# Patient Record
Sex: MALE | Race: WHITE | Employment: FULL TIME | URBAN - METROPOLITAN AREA
[De-identification: names, ages, dates, MRNs, and addresses within clinical notes are randomized per-mention and may not be internally consistent; named-entity substitution may affect disease eponyms.]

---

## 2017-02-17 ENCOUNTER — OFFICE VISIT (OUTPATIENT)
Dept: NEUROLOGY | Age: 32
End: 2017-02-17

## 2017-02-17 VITALS
HEART RATE: 78 BPM | HEIGHT: 68 IN | DIASTOLIC BLOOD PRESSURE: 80 MMHG | SYSTOLIC BLOOD PRESSURE: 140 MMHG | OXYGEN SATURATION: 98 % | WEIGHT: 244 LBS | BODY MASS INDEX: 36.98 KG/M2

## 2017-02-17 DIAGNOSIS — R55 SYNCOPE, UNSPECIFIED SYNCOPE TYPE: Primary | ICD-10-CM

## 2017-02-17 NOTE — MR AVS SNAPSHOT
Visit Information Date & Time Provider Department Dept. Phone Encounter #  
 2/17/2017  9:00 AM Torri Dao MD Neurology Clinic at [facility] [phone] [id number] Your Appointments 3/1/2017  9:45 AM  
New Patient with Bernardino Goldberg MD  
[facility]) Appt Note: np syonpee, ref by dr Sarah Ybarra, nicko [facility] 2-16-17  
 [id number] [facility]  
[phone] [address] [facility] Upcoming Health Maintenance Date Due DTaP/Tdap/Td series (1 - Tdap) 3/23/2006 INFLUENZA AGE 9 TO ADULT 8/1/2016 Allergies as of 2/17/2017  Never Reviewed Not on File Current Immunizations  Never Reviewed No immunizations on file. Not reviewed this visit You Were Diagnosed With   
  
 Codes Comments Syncope, unspecified syncope type    -  Primary ICD-10-CM: R55 
ICD-9-CM: 780. 2 Vitals BP Pulse Height(growth percentile) Weight(growth percentile) SpO2 BMI  
 140/80 78 5' 8\" (1.727 m) 244 lb (110.7 kg) 98% 37.1 kg/m2 Smoking Status Never Smoker Vitals History BMI and BSA Data Body Mass Index Body Surface Area  
 37.1 kg/m 2 2.3 m 2 Your Updated Medication List  
  
Notice  As of 2/17/2017  9:42 AM  
 You have not been prescribed any medications. To-Do List   
 02/18/2017 Imaging:  DUPLEX CAROTID BILATERAL AMB NEURO   
  
 02/18/2017 Neurology:  EEG   
  
 02/18/2017 Imaging:  MRI BRAIN W WO CONT Patient Instructions PRESCRIPTION REFILL POLICY [facility] Statement to Patients April 1, 2014 In an effort to ensure the large volume of patient prescription refills is processed in the most efficient and expeditious manner, we are asking our patients to assist us by calling your Pharmacy for all prescription refills, this will include also your  Mail Order Pharmacy. The pharmacy will contact our office electronically to continue the refill process. Please do not wait until the last minute to call your pharmacy. We need at least 48 hours (2days) to fill prescriptions. We also encourage you to call your pharmacy before going to  your prescription to make sure it is ready. With regard to controlled substance prescription refill requests (narcotic refills) that need to be picked up at our office, we ask your cooperation by providing us with at least 72 hours (3days) notice that you will need a refill. We will not refill narcotic prescription refill requests after 4:00pm on any weekday, Monday through Thursday, or after 2:00pm on Fridays, or on the weekends. We encourage everyone to explore another way of getting your prescription refill request processed using [employer], our patient web portal through our electronic medical record system. Cl[employer]rix is an efficient and effective way to communicate your medication request directly to the office and  downloadable as an sofia on your smart phone . [employer] also features a review functionality that allows you to view your medication list as well as leave messages for your physician. Are you ready to get connected? If so please review the attatched instructions or speak to any of our staff to get you set up right away! Thank you so much for your cooperation. Should you have any questions please contact our Practice Administrator. The Physicians and Staff,  [facility] Patient Instructions/Plans: 
 
 
Tips for [address] [place] [address] Drive Here are a few things you can do to help someone who is having a seizure of any kind: 
When encountering someone having a seizure many people feel ill-equipped in handling this circumstance. They forget that they already have within their possession one essential tool- common sense.  The following tips below are simple, common-sense steps to take when responding to a person having a seizure. First-aid isnt complicated, but it involves a sequence of actions and considerations which are beyond the scope of epilepsy. com. If you havent taken a course in first-aid and CPR (cardiopulmonary resuscitation) you can check within your community for classes being offered. Many seizure types-such as generalized absence seizures or complex partial seizures, which involve relatively brief episodes of unresponsiveness- dont require any specific first-aid measures. 1. Stay calm 2. Prevent injury During the seizure, you can exercise your common sense by insuring there is nothing within reach that could harm the person if she struck it. 3. Pay attention to the length of the seizure 4. Make the person as comfortable as possible 5. Keep onlookers away 6. Do not hold the person down If the person having a seizure thrashes around there is no need for you to restrain them. Remember to consider your safety as well 7. Do not put anything in the person's mouth Contrary to popular belief, a person having a seizure is incapable of swallowing their tongue so you can breathe easy in the knowledge that you do not have to stick your fingers into the mouth of someone in this condition. 8. Do not give the person water, pills, or food until fully alert 9. If the seizure continues for longer than five minutes, call 911 
10. Be sensitive and supportive, and ask others to do the same After the seizure, the person should be placed on her left side. Keep in mind there is a small risk of post-seizure vomiting, before the person is fully alert. Therefore, the persons head should be turned so that any vomit will drain out of the mouth without being inhaled. Stay with the person until she recovers (5 to 20 minutes). Introducing [facility]!    
 Olimpia Yanes introduces [employer] patient portal. Now you can access parts of your medical record, email your doctor's office, and request medication refills online. 1. In your internet browser, go to [email or web address] 2. Click on the First Time User? Click Here link in the Sign In box. You will see the New Member Sign Up page. 3. Enter your MegaHoot Access Code exactly as it appears below. You will not need to use this code after youve completed the sign-up process. If you do not sign up before the expiration date, you must request a new code. · MegaHoot Access Code: [email or web address] Expires: 5/18/2017  8:20 AM 
 
4. Enter the last four digits of your Social Security Number (xxxx) and Date of Birth (mm/dd/yyyy) as indicated and click Submit. You will be taken to the next sign-up page. 5. Create a MegaHoot ID. This will be your MegaHoot login ID and cannot be changed, so think of one that is secure and easy to remember. 6. Create a MegaHoot password. You can change your password at any time. 7. Enter your Password Reset Question and Answer. This can be used at a later time if you forget your password. 8. Enter your e-mail address. You will receive e-mail notification when new information is available in [address] 9. Click Sign Up. You can now view and download portions of your medical record. 10. Click the Download Summary menu link to download a portable copy of your medical information. If you have questions, please visit the Frequently Asked Questions section of the MegaH[employer] website. Remember, MegaHoot is NOT to be used for urgent needs. For medical emergencies, dial 911. Now available from your iPhone and Android! Please provide this summary of care documentation to your next provider. Your primary care clinician is listed as Tanner Gray. If you have any questions after today's visit, please call [phone].
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
## 2017-02-17 NOTE — PATIENT INSTRUCTIONS
[facility]   Statement to Patients  April 1, 2014      In an effort to ensure the large volume of patient prescription refills is processed in the most efficient and expeditious manner, we are asking our patients to assist us by calling your Pharmacy for all prescription refills, this will include also your  Mail Order Pharmacy. The pharmacy will contact our office electronically to continue the refill process. Please do not wait until the last minute to call your pharmacy. We need at least 48 hours (2days) to fill prescriptions. We also encourage you to call your pharmacy before going to  your prescription to make sure it is ready. With regard to controlled substance prescription refill requests (narcotic refills) that need to be picked up at our office, we ask your cooperation by providing us with at least 72 hours (3days) notice that you will need a refill. We will not refill narcotic prescription refill requests after 4:00pm on any weekday, Monday through Thursday, or after 2:00pm on Fridays, or on the weekends. We encourage everyone to explore another way of getting your prescription refill request processed using [employer], our patient web portal through our electronic medical record system. [employer] is an efficient and effective way to communicate your medication request directly to the office and  downloadable as an sofia on your smart phone . Recommend[employer] also features a review functionality that allows you to view your medication list as well as leave messages for your physician. Are you ready to get connected? If so please review the attatched instructions or speak to any of our staff to get you set up right away! Thank you so much for your cooperation. Should you have any questions please contact our Practice Administrator.     The Physicians and Staff,  [facility]     Patient Instructions/Plans:      Tips for [facility] FIRST AID    Here are a few things you can do to help someone who is having a seizure of any kind:  When encountering someone having a seizure many people feel ill-equipped in handling this circumstance. They forget that they already have within their possession one essential tool- common sense. The following tips below are simple, common-sense steps to take when responding to a person having a seizure. First-aid isnt complicated, but it involves a sequence of actions and considerations which are beyond the scope of epilepsy. com. If you havent taken a course in first-aid and CPR (cardiopulmonary resuscitation) you can check within your community for classes being offered. Many seizure types-such as generalized absence seizures or complex partial seizures, which involve relatively brief episodes of unresponsiveness- dont require any specific first-aid measures. 1. Stay calm  2. Prevent injury  During the seizure, you can exercise your common sense by insuring there is nothing within reach that could harm the person if she struck it. 3. Pay attention to the length of the seizure  4. Make the person as comfortable as possible  5. Keep onlookers away  6. Do not hold the person down  If the person having a seizure thrashes around there is no need for you to restrain them. Remember to consider your safety as well  7. Do not put anything in the person's mouth  Contrary to popular belief, a person having a seizure is incapable of swallowing their tongue so you can breathe easy in the knowledge that you do not have to stick your fingers into the mouth of someone in this condition. 8. Do not give the person water, pills, or food until fully alert  9. If the seizure continues for longer than five minutes, call 911  10. Be sensitive and supportive, and ask others to do the same  After the seizure, the person should be placed on her left side.  Keep in mind there is a small risk of post-seizure vomiting, before the person is fully alert. Therefore, the persons head should be turned so that any vomit will drain out of the mouth without being inhaled. Stay with the person until she recovers (5 to 20 minutes).
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
## 2017-02-23 ENCOUNTER — OFFICE VISIT (OUTPATIENT)
Dept: NEUROLOGY | Age: 32
End: 2017-02-23

## 2017-02-23 DIAGNOSIS — I65.23 STENOSIS OF BOTH INTERNAL CAROTID ARTERIES: ICD-10-CM

## 2017-02-23 DIAGNOSIS — R55 SYNCOPE, UNSPECIFIED SYNCOPE TYPE: Primary | ICD-10-CM

## 2017-02-23 NOTE — PROCEDURES
This study consisted of pulsed wave Doppler examination, Color-flow imaging, and Duplex imaging of both the right and left carotid systems, and both vertebral arteries.        Imaging of both right and left carotid systems showed no mixed plaquing at the bifurcations and proximal and distal internal and external carotid arteries bilaterally, with stenosis in the range of 0 % only and with no flow abnormalities identified.        Both vertebral arteries showed normal antegrade flow.     Clinical correlation recommended

## 2017-03-01 ENCOUNTER — OFFICE VISIT (OUTPATIENT)
Dept: CARDIOLOGY CLINIC | Age: 32
End: 2017-03-01

## 2017-03-01 ENCOUNTER — CLINICAL SUPPORT (OUTPATIENT)
Dept: CARDIOLOGY CLINIC | Age: 32
End: 2017-03-01

## 2017-03-01 VITALS
DIASTOLIC BLOOD PRESSURE: 70 MMHG | WEIGHT: 247.25 LBS | HEART RATE: 81 BPM | OXYGEN SATURATION: 97 % | SYSTOLIC BLOOD PRESSURE: 118 MMHG | RESPIRATION RATE: 16 BRPM | HEIGHT: 68 IN | BODY MASS INDEX: 37.47 KG/M2

## 2017-03-01 DIAGNOSIS — S26.91XA CARDIAC CONTUSION: ICD-10-CM

## 2017-03-01 DIAGNOSIS — R55 SYNCOPE, UNSPECIFIED SYNCOPE TYPE: ICD-10-CM

## 2017-03-01 DIAGNOSIS — R42 DIZZINESS: Primary | ICD-10-CM

## 2017-03-01 DIAGNOSIS — R42 DIZZINESS: ICD-10-CM
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
## 2017-03-01 NOTE — MR AVS SNAPSHOT
Visit Information Date & Time Provider Department Dept. Phone Encounter #  
 3/1/2017  9:45 AM Roger Spear, [address] [facility]  Your Appointments 3/1/2017 11:00 AM  
ECHO CARDIOGRAMS 2D with ECHO, [facility] [address]) Appt Note: 2D ECHO COMPLETE ADULT (TTE) W OR WO CONTR [21386 CPT(R)]  
 [address] [facility] [address]  
[phone] [address] [facility] [address] Upcoming Health Maintenance Date Due DTaP/Tdap/Td series (1 - Tdap) 3/23/2006 INFLUENZA AGE 9 TO ADULT 8/1/2016 Allergies as of 3/1/2017  Review Complete On: 3/1/2017 By: Matthew Menezes LPN No Known Allergies Current Immunizations  Never Reviewed No immunizations on file. Not reviewed this visit You Were Diagnosed With   
  
 Codes Comments Dizziness    -  Primary ICD-10-CM: T08 ICD-9-CM: 780.4 Syncope, unspecified syncope type     ICD-10-CM: R55 
ICD-9-CM: 780.2 BMI 37.0-37.9, adult     ICD-10-CM: Z68.37 ICD-9-CM: V85.37 Cardiac contusion     ICD-10-CM: K85.89RA ICD-9-CM: 861.01 Vitals BP  
  
  
  
  
  
 118/70 (BP 1 Location: Right arm, BP Patient Position: Standing) Vitals History BMI and BSA Data Body Mass Index Body Surface Area  
 37.59 kg/m 2 2.32 m 2 Your Updated Medication List  
  
Notice  As of 3/1/2017 10:38 AM  
 You have not been prescribed any medications. We Performed the Following AMB POC EKG ROUTINE W/ 12 LEADS, INTER & REP [66927 CPT(R)] To-Do List   
 Around 03/01/2017 ECHO:  2D ECHO COMPLETE ADULT (TTE) W OR WO CONTR   
  
 03/03/2017 1:00 PM  
  Appointment with EEG TECH 1 [facility] EEG ([phone]) The patient's hair must be clean without oils, mousse, and spray, no will or metal in the hair.   
  
 03/03/2017 3:15 PM  
 Appointment with [facility] 1 at [facility] MRI Department ([phone]) 1. Please bring a list or a bag of your current medications to your appointment 2. Please be sure to remove ALL hair clips, pins, extensions, etc., prior to arriving for your MRI procedure. 3. Bring any non [facility] films or CDs pertaining to the area being imaged with you on the day of appointment. 4. A written order with a valid diagnosis and Physicians  signature is required for all scheduled tests. 5. Check in at registration 30min before your appointment time unless you were instructed to do otherwise. Introducing [facility] SERVICES! Sarah Hope introduces [employer] patient portal. Now you can access parts of your medical record, email your doctor's office, and request medication refills online. 1. In your internet browser, go to [email or web address] 2. Click on the First Time User? Click Here link in the Sign In box. You will see the New Member Sign Up page. 3. Enter your [employer] Access Code exactly as it appears below. You will not need to use this code after youve completed the sign-up process. If you do not sign up before the expiration date, you must request a new code. · [employer] Access Code: [email or web address] Expires: 5/18/2017  8:20 AM 
 
4. Enter the last four digits of your Social Security Number (xxxx) and Date of Birth (mm/dd/yyyy) as indicated and click Submit. You will be taken to the next sign-up page. 5. Create a [employer] ID. This will be your [employer] login ID and cannot be changed, so think of one that is secure and easy to remember. 6. Create a [employer] password. You can change your password at any time. 7. Enter your Password Reset Question and Answer. This can be used at a later time if you forget your password. 8. Enter your e-mail address. You will receive e-mail notification when new information is available in [address] 9. Click Sign Up. You can now view and download portions of your medical record. 10. Click the Download Summary menu link to download a portable copy of your medical information. If you have questions, please visit the Frequently Asked Questions section of the [employer] website. Remember, [employer] is NOT to be used for urgent needs. For medical emergencies, dial 911. Now available from your iPhone and Android! Please provide this summary of care documentation to your next provider. Your primary care clinician is listed as Roberta Curran. If you have any questions after today's visit, please call [phone].

## 2017-03-03 ENCOUNTER — HOSPITAL ENCOUNTER (OUTPATIENT)
Dept: MRI IMAGING | Age: 32
Discharge: HOME | End: 2017-03-03
Attending: PSYCHIATRY & NEUROLOGY
Payer: COMMERCIAL

## 2017-03-03 ENCOUNTER — HOSPITAL ENCOUNTER (OUTPATIENT)
Dept: NEUROLOGY | Age: 32
Discharge: HOME | End: 2017-03-03
Attending: PSYCHIATRY & NEUROLOGY
Payer: COMMERCIAL

## 2017-03-03 DIAGNOSIS — R55 SYNCOPE, UNSPECIFIED SYNCOPE TYPE: ICD-10-CM

## 2017-03-03 LAB — CREAT BLD-MCNC: 1 MG/DL (ref 0.6–1.3)

## 2017-03-03 PROCEDURE — 82565 ASSAY OF CREATININE: CPT

## 2017-03-03 PROCEDURE — 95816 EEG AWAKE AND DROWSY: CPT

## 2017-03-03 PROCEDURE — 74011250636 HC RX REV CODE- 250/636: Performed by: PSYCHIATRY & NEUROLOGY

## 2017-03-03 PROCEDURE — 70553 MRI BRAIN STEM W/O & W/DYE: CPT

## 2017-03-03 PROCEDURE — A9585 GADOBUTROL INJECTION: HCPCS | Performed by: PSYCHIATRY & NEUROLOGY

## 2017-03-03 RX ADMIN — GADOBUTROL 10 ML: 604.72 INJECTION INTRAVENOUS at 15:00

## 2017-03-03 NOTE — PROCEDURES
[address] [facility] [address], [address]   EEG       Name:  Phillip Canales   MR#:  [id number]   :  1985   Account #:  [de-identified]    Date of Procedure:  2017   Date of Adm:  2017       DATE OF SERVICE: 2017    REQUESTING PHYSICIAN: Alex Garcia MD    HISTORY: The patient is a 49-year-old male who is being evaluated   for multiple recurrent syncope episodes. DESCRIPTION: This is an 18-channel EEG performed on an awake   patient. The dominant posterior background rhythm consists of   symmetric, well-modulated, medium voltage rhythms in the 9 Hz   frequency range. Faster beta frequencies were seen in the frontal head   region. Drowsiness is also seen for a brief period and was characterized   by slowing in the central head region. Photic stimulation elicits a   symmetric driving response. Hyperventilation did not produce any   abnormalities. EEG SUMMARY: Normal study. CLINICAL INTERPRETATION: This is a normal EEG during awake   and drowsy states of the patient. No lateralizing or epileptiform   features were noted.         Justina Steiner MD      AS / THS   D:  2017   14:34   T:  2017   17:16   Job #:  [id number]

## 2017-03-07 ENCOUNTER — TELEPHONE (OUTPATIENT)
Dept: CARDIOLOGY CLINIC | Age: 32
End: 2017-03-07

## 2017-03-07 NOTE — TELEPHONE ENCOUNTER
----- Message from MITCHEL Young sent at 3/6/2017 10:53 AM EST -----  structurally normal heart per echo.

## 2017-03-13 ENCOUNTER — CLINICAL SUPPORT (OUTPATIENT)
Dept: CARDIOLOGY CLINIC | Age: 32
End: 2017-03-13

## 2017-03-13 DIAGNOSIS — R42 DIZZINESS: ICD-10-CM

## 2017-03-13 DIAGNOSIS — R55 SYNCOPE, UNSPECIFIED SYNCOPE TYPE: ICD-10-CM

## 2017-03-13 DIAGNOSIS — S26.91XA CARDIAC CONTUSION: ICD-10-CM

## 2017-03-27 ENCOUNTER — TELEPHONE (OUTPATIENT)
Dept: CARDIOLOGY CLINIC | Age: 32
End: 2017-03-27

## 2017-03-27 NOTE — TELEPHONE ENCOUNTER
[address] March 27, 2017       RE: Lenora Patterson      To Whom It May Concern,    This is to certify that Lenora Patterson is currently undergoing medical evaluation and should be on light duty at work until further notice. 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 Other duties are permitted. He was seen initially in our office on the 1st of March. Please feel free to contact my office if you have any questions or concerns. Thank you for your assistance in this matter.       Sincerely,    Trey Marcus NP

## 2017-03-27 NOTE — TELEPHONE ENCOUNTER
Patient called back stating they received the letter but needed more information on the letter. 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

## 2017-03-27 NOTE — TELEPHONE ENCOUNTER
Verified patient with two identifiers. Patient called stating he was informed by him employer [employer] alf that he needs a note by the end of this week stating he needs to be on light duty for 1 month or he will lose his job. Also patient currently wearing event monitor. Please advise if we can give him a note.

## 2017-03-27 NOTE — TELEPHONE ENCOUNTER
Letter below was faxed to [address] [place] [address] . Left message for patient to return my call.

## 2017-03-28 ENCOUNTER — TELEPHONE (OUTPATIENT)
Dept: NEUROLOGY | Age: 32
End: 2017-03-28

## 2017-03-28 NOTE — TELEPHONE ENCOUNTER
----- Message from Marleny Ahumada sent at 3/28/2017  1:45 PM EDT -----  Regarding: Dr. Karina Greco  Pt (P) [phone] needs to have the results of the MRI, EEG, and Doppler sent to Dr Shaheen Oconnor, [address] [facility]) [phone] so that the patient can be cleared to go back work. Pt will be at Dr. Felicia Armenta office at 9 am on 03/29/17.

## 2017-03-28 NOTE — TELEPHONE ENCOUNTER
----- Message from Monique Benitez sent at 3/28/2017  1:45 PM EDT -----  Regarding: Dr. Sylvia Yoon  Pt (P) [phone] needs to have the results of the MRI, EEG, and Doppler sent to Dr Nitish Dunlap, [address] [facility]) [phone] so that the patient can be cleared to go back work. Pt will be at Dr. Anastacio Padilla office at 9 am on 03/29/17.

## 2017-04-13 ENCOUNTER — TELEPHONE (OUTPATIENT)
Dept: CARDIOLOGY CLINIC | Age: 32
End: 2017-04-13

## 2017-04-13 NOTE — TELEPHONE ENCOUNTER
----- Message from MITCHEL Deleon sent at 4/13/2017  1:58 PM EDT -----  Please let him know that his monitor showed normal rhythm.

## 2017-04-13 NOTE — PROGRESS NOTES
Verified patient with two identifiers. Informed him know that his monitor showed normal rhythm. He verbalized understanding.

## 2017-04-13 NOTE — TELEPHONE ENCOUNTER
Notes Recorded by Jarod Sheppard LPN on 7/09/9861 at 3:71 PM  Verified patient with two identifiers.  Informed him know that his monitor showed normal rhythm. He verbalized understanding. Patient also wanted to know if he was cleared to return back to work at [employer] without restriction and if so he need letter stating that and a letter to Dr. Roger Andrade as well.

## 2017-04-14 NOTE — TELEPHONE ENCOUNTER
LMVM to call us. May return to work without restrictions. When is pt returning to work, need date to put in letter.

## 2017-04-25 ENCOUNTER — DOCUMENTATION ONLY (OUTPATIENT)
Dept: NEUROLOGY | Age: 32
End: 2017-04-25

## 2017-04-25 NOTE — PROGRESS NOTES
return to work letter completed and will be faxed to the  [facility]   Attention Candy Tovar after signing by Dr. Ashley Wright on May 1st.

## 2017-07-29 ENCOUNTER — OFFICE VISIT (OUTPATIENT)
Dept: PRIMARY CARE CLINIC | Age: 32
End: 2017-07-29

## 2017-07-29 VITALS
TEMPERATURE: 97.7 F | HEIGHT: 68 IN | DIASTOLIC BLOOD PRESSURE: 86 MMHG | RESPIRATION RATE: 16 BRPM | SYSTOLIC BLOOD PRESSURE: 123 MMHG | OXYGEN SATURATION: 98 % | BODY MASS INDEX: 36.86 KG/M2 | WEIGHT: 243.2 LBS | HEART RATE: 86 BPM

## 2017-07-29 DIAGNOSIS — S80.11XA CONTUSION OF RIGHT LOWER LEG, INITIAL ENCOUNTER: ICD-10-CM

## 2017-07-29 DIAGNOSIS — S63.92XA HAND SPRAIN, LEFT, INITIAL ENCOUNTER: Primary | ICD-10-CM

## 2017-07-29 NOTE — PATIENT INSTRUCTIONS
Hand Sprain: Care Instructions  Your Care Instructions  A hand sprain occurs when you stretch or tear a ligament in your hand. Ligaments are the tough tissues that connect one bone to another. Most hand sprains will heal with treatment you can do at home. Follow-up care is a key part of your treatment and safety. Be sure to make and go to all appointments, and call your doctor if you are having problems. It's also a good idea to know your test results and keep a list of the medicines you take. How can you care for yourself at home? · If your doctor gave you a splint or immobilizer, wear it as directed. This will help keep swelling down and help your hand heal.  · Follow your doctor's directions for exercise and other activity. · For the first 2 days after your injury, avoid things that might increase swelling, such as hot showers, hot tubs, or hot packs. · Put ice or a cold pack on your hand for 10 to 20 minutes at a time to stop swelling. Try this every 1 to 2 hours for 3 days (when you are awake) or until the swelling goes down. Put a thin cloth between the ice pack and your skin. Keep your splint dry. · After 2 or 3 days, if your swelling is gone, put a heating pad (set on low) or a warm cloth on your hand. Some experts suggest that you go back and forth between hot and cold treatments. · Prop up your hand on a pillow when you ice it or anytime you sit or lie down. Try to keep it above the level of your heart. This will help reduce swelling. · Take pain medicines exactly as directed. ¨ If the doctor gave you a prescription medicine for pain, take it as prescribed. ¨ If you are not taking a prescription pain medicine, ask your doctor if you can take an over-the-counter medicine. · Return to your usual level of activity slowly. When should you call for help? Call your doctor now or seek immediate medical care if:  · Your pain is worse. · You have new or increased swelling in your hand.   · You cannot move your hand. · You have tingling, weakness, or numbness in your hand or fingers. · Your hand or fingers are cool or pale or change color. · You have a fever. · Your hand or fingers are red. Watch closely for changes in your health, and be sure to contact your doctor if:  · Your hand does not get better as expected. Where can you learn more? Go to http://neida-mariela.info/. Enter J258 in the search box to learn more about \"Hand Sprain: Care Instructions. \"  Current as of: March 21, 2017  Content Version: 11.3  © 9624-5660 [employer]. Care instructions adapted under license by [employer] (which disclaims liability or warranty for this information). If you have questions about a medical condition or this instruction, always ask your healthcare professional. [address] any warranty or liability for your use of this information. Bruises: Care Instructions  Your Care Instructions    Bruises occur when small blood vessels under the skin tear or rupture, most often from a twist, bump, or fall. Blood leaks into tissues under the skin and causes a black-and-blue spot that often turns colors, including purplish black, reddish blue, or yellowish green, as the bruise heals. Bruises hurt, but most are not serious and will go away on their own within 2 to 4 weeks. Sometimes, gravity causes them to spread down the body. A leg bruise usually will take longer to heal than a bruise on the face or arms. Follow-up care is a key part of your treatment and safety. Be sure to make and go to all appointments, and call your doctor if you are having problems. Its also a good idea to know your test results and keep a list of the medicines you take. How can you care for yourself at home? · Take pain medicines exactly as directed. ¨ If the doctor gave you a prescription medicine for pain, take it as prescribed.   ¨ If you are not taking a prescription pain medicine, ask your doctor if you can take an over-the-counter medicine. · Put ice or a cold pack on the area for 10 to 20 minutes at a time. Put a thin cloth between the ice and your skin. · If you can, prop up the bruised area on pillows as much as possible for the next few days. Try to keep the bruise above the level of your heart. When should you call for help? Call your doctor now or seek immediate medical care if:  · You have signs of infection, such as:  ¨ Increased pain, swelling, warmth, or redness. ¨ Red streaks leading from the bruise. ¨ Pus draining from the bruise. ¨ A fever. · You have a bruise on your leg and signs of a blood clot, such as:  ¨ Increasing redness and swelling along with warmth, tenderness, and pain in the bruised area. ¨ Pain in your calf, back of the knee, thigh, or groin. ¨ Redness and swelling in your leg or groin. · Your pain gets worse. Watch closely for changes in your health, and be sure to contact your doctor if:  · You do not get better as expected. Where can you learn more? Go to http://neida-mariela.info/. Enter (38) 707-235 in the search box to learn more about \"Bruises: Care Instructions. \"  Current as of: March 20, 2017  Content Version: 11.3  © 3054-3181 [employer]. Care instructions adapted under license by [employer] (which disclaims liability or warranty for this information). If you have questions about a medical condition or this instruction, always ask your healthcare professional. [place] [address] any warranty or liability for your use of this information.

## 2017-07-29 NOTE — PROGRESS NOTES
Chief Complaint   Patient presents with   Ruther James at work off of platform and hit right shin, and caught self with left hand.  Left hand swollen and painful

## 2017-07-29 NOTE — MR AVS SNAPSHOT
Visit Information Date & Time Provider Department Dept. Phone Encounter #  
 7/29/2017  1:30 PM Derrell Mcgrath, [address] [phone] [id number] Follow-up Instructions Return if symptoms worsen or fail to improve. Upcoming Health Maintenance Date Due DTaP/Tdap/Td series (1 - Tdap) 3/23/2006 INFLUENZA AGE 9 TO ADULT 8/1/2017 Allergies as of 7/29/2017  Review Complete On: 7/29/2017 By: Derrell Mcgrath NP No Known Allergies Current Immunizations  Never Reviewed No immunizations on file. Not reviewed this visit You Were Diagnosed With   
  
 Codes Comments Hand sprain, left, initial encounter    -  Primary ICD-10-CM: K05.54LM ICD-9-CM: 842.10 Vitals BP Pulse Temp Resp Height(growth percentile) Weight(growth percentile) 123/86 86 97.7 °F (36.5 °C) (Oral) 16 5' 8\" (1.727 m) 243 lb 3.2 oz (110.3 kg) SpO2 BMI Smoking Status 98% 36.98 kg/m2 Never Smoker Vitals History BMI and BSA Data Body Mass Index Body Surface Area  
 36.98 kg/m 2 2.3 m 2 Preferred Pharmacy Pharmacy Name Phone [id number] [phone] Your Updated Medication List  
  
Notice  As of 7/29/2017  2:25 PM  
 You have not been prescribed any medications. Follow-up Instructions Return if symptoms worsen or fail to improve. To-Do List   
 07/29/2017 Imaging:  XR HAND LT MIN 3 V Patient Instructions Hand Sprain: Care Instructions Your Care Instructions A hand sprain occurs when you stretch or tear a ligament in your hand. Ligaments are the tough tissues that connect one bone to another. Most hand sprains will heal with treatment you can do at home. Follow-up care is a key part of your treatment and safety.  Be sure to make and go to all appointments, and call your doctor if you are having problems. It's also a good idea to know your test results and keep a list of the medicines you take. How can you care for yourself at home? · If your doctor gave you a splint or immobilizer, wear it as directed. This will help keep swelling down and help your hand heal. 
· Follow your doctor's directions for exercise and other activity. · For the first 2 days after your injury, avoid things that might increase swelling, such as hot showers, hot tubs, or hot packs. · Put ice or a cold pack on your hand for 10 to 20 minutes at a time to stop swelling. Try this every 1 to 2 hours for 3 days (when you are awake) or until the swelling goes down. Put a thin cloth between the ice pack and your skin. Keep your splint dry. · After 2 or 3 days, if your swelling is gone, put a heating pad (set on low) or a warm cloth on your hand. Some experts suggest that you go back and forth between hot and cold treatments. · Prop up your hand on a pillow when you ice it or anytime you sit or lie down. Try to keep it above the level of your heart. This will help reduce swelling. · Take pain medicines exactly as directed. ¨ If the doctor gave you a prescription medicine for pain, take it as prescribed. ¨ If you are not taking a prescription pain medicine, ask your doctor if you can take an over-the-counter medicine. · Return to your usual level of activity slowly. When should you call for help? Call your doctor now or seek immediate medical care if: 
· Your pain is worse. · You have new or increased swelling in your hand. · You cannot move your hand. · You have tingling, weakness, or numbness in your hand or fingers. · Your hand or fingers are cool or pale or change color. · You have a fever. · Your hand or fingers are red. Watch closely for changes in your health, and be sure to contact your doctor if: 
· Your hand does not get better as expected. Where can you learn more? Go to http://neida-mariela.info/. Enter F869 in the search box to learn more about \"Hand Sprain: Care Instructions. \" Current as of: March 21, 2017 Content Version: 11.3 © 2822-4926 [employer]. Care instructions adapted under license by [employer] (which disclaims liability or warranty for this information). If you have questions about a medical condition or this instruction, always ask your healthcare professional. [place] [address] any warranty or liability for your use of this information. Bruises: Care Instructions Your Care Instructions Bruises occur when small blood vessels under the skin tear or rupture, most often from a twist, bump, or fall. Blood leaks into tissues under the skin and causes a black-and-blue spot that often turns colors, including purplish black, reddish blue, or yellowish green, as the bruise heals. Bruises hurt, but most are not serious and will go away on their own within 2 to 4 weeks. Sometimes, gravity causes them to spread down the body. A leg bruise usually will take longer to heal than a bruise on the face or arms. Follow-up care is a key part of your treatment and safety. Be sure to make and go to all appointments, and call your doctor if you are having problems. Its also a good idea to know your test results and keep a list of the medicines you take. How can you care for yourself at home? · Take pain medicines exactly as directed. ¨ If the doctor gave you a prescription medicine for pain, take it as prescribed. ¨ If you are not taking a prescription pain medicine, ask your doctor if you can take an over-the-counter medicine. · Put ice or a cold pack on the area for 10 to 20 minutes at a time. Put a thin cloth between the ice and your skin. · If you can, prop up the bruised area on pillows as much as possible for the next few days. Try to keep the bruise above the level of your heart. When should you call for help? Call your doctor now or seek immediate medical care if: 
· You have signs of infection, such as: 
¨ Increased pain, swelling, warmth, or redness. ¨ Red streaks leading from the bruise. ¨ Pus draining from the bruise. ¨ A fever. · You have a bruise on your leg and signs of a blood clot, such as: 
¨ Increasing redness and swelling along with warmth, tenderness, and pain in the bruised area. ¨ Pain in your calf, back of the knee, thigh, or groin. ¨ Redness and swelling in your leg or groin. · Your pain gets worse. Watch closely for changes in your health, and be sure to contact your doctor if: 
· You do not get better as expected. Where can you learn more? Go to http://neida-mariela.info/. Enter ([phone] [phone] in the search box to learn more about \"Bruises: Care Instructions. \" Current as of: March 20, 2017 Content Version: 11.3 © 6205-9090 [employer]. Care instructions adapted under license by [employer] (which disclaims liability or warranty for this information). If you have questions about a medical condition or this instruction, always ask your healthcare professional. [place] [address] any warranty or liability for your use of this information. Introducing [facility]! [facility] introduces [employer] patient portal. Now you can access parts of your medical record, email your doctor's office, and request medication refills online. 1. In your internet browser, go to [email or web address] 2. Click on the First Time User? Click Here link in the Sign In box. You will see the New Member Sign Up page. 3. Enter your [employer] Access Code exactly as it appears below. You will not need to use this code after youve completed the sign-up process. If you do not sign up before the expiration date, you must request a new code.  
 
· [employer] Access Code: [email or web address] 
 Expires: 10/27/2017  1:26 PM 
 
4. Enter the last four digits of your Social Security Number (xxxx) and Date of Birth (mm/dd/yyyy) as indicated and click Submit. You will be taken to the next sign-up page. 5. Create a [employer] ID. This will be your [employer] login ID and cannot be changed, so think of one that is secure and easy to remember. 6. Create a [employer] password. You can change your password at any time. 7. Enter your Password Reset Question and Answer. This can be used at a later time if you forget your password. 8. Enter your e-mail address. You will receive e-mail notification when new information is available in [address] 9. Click Sign Up. You can now view and download portions of your medical record. 10. Click the Download Summary menu link to download a portable copy of your medical information. If you have questions, please visit the Frequently Asked Questions section of the [employer] website. Remember, [employer] is NOT to be used for urgent needs. For medical emergencies, dial 911. Now available from your iPhone and Android! Please provide this summary of care documentation to your next provider. Your primary care clinician is listed as Ubaldo Leija. If you have any questions after today's visit, please call [phone].

## 2017-08-02 ENCOUNTER — TELEPHONE (OUTPATIENT)
Dept: PRIMARY CARE CLINIC | Age: 32
End: 2017-08-02

## 2017-08-04 NOTE — TELEPHONE ENCOUNTER
Patient walked in to Urgent Care wanting his Occupational Health follow-up and return to work. He was advised to have his employer set him up an apppointment with [address] during their office hours.

## 2025-03-11 NOTE — PROGRESS NOTES
Anesthesia Transfer of Care Note    Patient: Jerardo Powers    Procedure(s) Performed: Procedure(s) (LRB):  COLONOSCOPY (N/A)    Patient location: PACU    Anesthesia Type: general    Transport from OR: Transported from OR on room air with adequate spontaneous ventilation    Post pain: adequate analgesia    Post assessment: no apparent anesthetic complications    Post vital signs: stable    Level of consciousness: sedated    Nausea/Vomiting: no nausea/vomiting    Complications: none    Transfer of care protocol was followed      Last vitals: Visit Vitals  /78 (BP Location: Left arm, Patient Position: Lying)   Pulse 66   Temp 36.7 °C (98.1 °F) (Temporal)   Resp 17   SpO2 98%      Subjective:      Demetrius Krause is a 28 y.o. male seen for evaluation and treatment of a left hand and right leg injury. This is evaluated as a workers compensation injury. The injury was sustained 5 hours ago, and occurred while he was walking up a step, his boot caught on the edge of the step, and he fell forward with all weight on his left hand. He also hit his right shin on the next step causing a small open superficial cut and a sore area. Benna Him He did not hear or sense a pop or snap at the time of the injury. The patient notes pain and mild swelling since the injury. He has not injured this site in the past.    Patient Active Problem List   Diagnosis Code    Stenosis of both internal carotid arteries I65.23    Dizziness R42     Patient Active Problem List    Diagnosis Date Noted    Dizziness 03/01/2017    Stenosis of both internal carotid arteries 02/23/2017       No Known Allergies  Past Medical History:   Diagnosis Date    Von Willebrand disease ([facility] [address]     dx in youth     History reviewed. No pertinent surgical history. Family History   Problem Relation Age of Onset    No Known Problems Father      Social History   Substance Use Topics    Smoking status: Never Smoker    Smokeless tobacco: Former User     Types: Chew     Quit date: 3/1/2004    Alcohol use 0.6 - 1.2 oz/week     1 - 2 Shots of liquor per week        Objective:     Visit Vitals    /86    Pulse 86    Temp 97.7 °F (36.5 °C) (Oral)    Resp 16    Ht 5' 8\" (1.727 m)    Wt 243 lb 3.2 oz (110.3 kg)    SpO2 98%    BMI 36.98 kg/m2      Appearance: alert, well appearing, and in no distress. Musculoskeletal exam: abnormal exam of right hand, mild swelling over the center back of hand, mild tenderness to palpation, no deformity noted. Pain on full extension of fingers and making a tight fist. No bruising or open skin.    Right shin with a 1.5 cm superficial cut, no bleeding, the area around it is tender, mildly swollen, without discoloration. Imaging  is performed, appears normal - no fracture    Assessment/Plan:       ICD-10-CM ICD-9-CM    1. Hand sprain, left, initial encounter S63. 92XA 842.10 XR HAND LT MIN 3 V   2. Contusion of right lower leg, initial encounter S80.11XA 924.10    Ibuprofen 600 mg q 6-8 hours with food for 2-3 days, then as needed. The patient is advised to rest, apply cold or ice intermittently, elevate affected extremity, use ACE bandage, gradually reintroduce usual activities, avoid heavy lifting until better and return PRN if symptoms persist or worsen.